# Patient Record
Sex: FEMALE | Race: WHITE | NOT HISPANIC OR LATINO | URBAN - METROPOLITAN AREA
[De-identification: names, ages, dates, MRNs, and addresses within clinical notes are randomized per-mention and may not be internally consistent; named-entity substitution may affect disease eponyms.]

---

## 2018-11-14 PROBLEM — Z00.00 ENCOUNTER FOR PREVENTIVE HEALTH EXAMINATION: Status: ACTIVE | Noted: 2018-11-14

## 2018-11-19 ENCOUNTER — OUTPATIENT (OUTPATIENT)
Dept: OUTPATIENT SERVICES | Facility: HOSPITAL | Age: 53
LOS: 1 days | End: 2018-11-19
Payer: COMMERCIAL

## 2018-11-19 ENCOUNTER — APPOINTMENT (OUTPATIENT)
Dept: MRI IMAGING | Facility: CLINIC | Age: 53
End: 2018-11-19
Payer: COMMERCIAL

## 2018-11-19 DIAGNOSIS — Z00.8 ENCOUNTER FOR OTHER GENERAL EXAMINATION: ICD-10-CM

## 2018-11-19 PROCEDURE — 72141 MRI NECK SPINE W/O DYE: CPT | Mod: 26

## 2018-11-19 PROCEDURE — 72141 MRI NECK SPINE W/O DYE: CPT

## 2018-11-23 ENCOUNTER — APPOINTMENT (OUTPATIENT)
Dept: MRI IMAGING | Facility: CLINIC | Age: 53
End: 2018-11-23
Payer: COMMERCIAL

## 2018-11-23 ENCOUNTER — OUTPATIENT (OUTPATIENT)
Dept: OUTPATIENT SERVICES | Facility: HOSPITAL | Age: 53
LOS: 1 days | End: 2018-11-23
Payer: COMMERCIAL

## 2018-11-23 DIAGNOSIS — Z00.8 ENCOUNTER FOR OTHER GENERAL EXAMINATION: ICD-10-CM

## 2018-11-23 PROCEDURE — 70551 MRI BRAIN STEM W/O DYE: CPT | Mod: 26

## 2018-11-23 PROCEDURE — 70551 MRI BRAIN STEM W/O DYE: CPT

## 2018-12-17 ENCOUNTER — OUTPATIENT (OUTPATIENT)
Dept: OUTPATIENT SERVICES | Facility: HOSPITAL | Age: 53
LOS: 1 days | End: 2018-12-17
Payer: COMMERCIAL

## 2018-12-17 ENCOUNTER — APPOINTMENT (OUTPATIENT)
Dept: RADIOLOGY | Facility: HOSPITAL | Age: 53
End: 2018-12-17

## 2018-12-17 DIAGNOSIS — G35 MULTIPLE SCLEROSIS: ICD-10-CM

## 2018-12-17 DIAGNOSIS — Z00.00 ENCOUNTER FOR GENERAL ADULT MEDICAL EXAMINATION WITHOUT ABNORMAL FINDINGS: ICD-10-CM

## 2018-12-17 LAB
APPEARANCE CSF: CLEAR — SIGNIFICANT CHANGE UP
APPEARANCE SPUN FLD: COLORLESS — SIGNIFICANT CHANGE UP
COLOR CSF: SIGNIFICANT CHANGE UP
GLUCOSE CSF-MCNC: 70 MG/DL — SIGNIFICANT CHANGE UP (ref 40–70)
LYMPHOCYTES # CSF: 45 % — SIGNIFICANT CHANGE UP (ref 40–80)
MONOS+MACROS NFR CSF: 55 % — HIGH (ref 15–45)
NEUTROPHILS # CSF: 0 % — SIGNIFICANT CHANGE UP (ref 0–6)
NRBC NFR CSF: 4 /UL — SIGNIFICANT CHANGE UP (ref 0–5)
PROT CSF-MCNC: 81 MG/DL — HIGH (ref 15–45)
RBC # CSF: 0 /UL — SIGNIFICANT CHANGE UP (ref 0–0)
TUBE TYPE: SIGNIFICANT CHANGE UP

## 2018-12-17 PROCEDURE — 83873 ASSAY OF CSF PROTEIN: CPT

## 2018-12-17 PROCEDURE — 77003 FLUOROGUIDE FOR SPINE INJECT: CPT | Mod: 26

## 2018-12-17 PROCEDURE — 82945 GLUCOSE OTHER FLUID: CPT

## 2018-12-17 PROCEDURE — 62270 DX LMBR SPI PNXR: CPT

## 2018-12-17 PROCEDURE — 86617 LYME DISEASE ANTIBODY: CPT

## 2018-12-17 PROCEDURE — 82787 IGG 1 2 3 OR 4 EACH: CPT

## 2018-12-17 PROCEDURE — 77003 FLUOROGUIDE FOR SPINE INJECT: CPT

## 2018-12-17 PROCEDURE — 84166 PROTEIN E-PHORESIS/URINE/CSF: CPT

## 2018-12-17 PROCEDURE — 84157 ASSAY OF PROTEIN OTHER: CPT

## 2018-12-17 PROCEDURE — 89051 BODY FLUID CELL COUNT: CPT

## 2018-12-18 LAB
ALBUMIN CSF-MCNC: 49.4 MG/DL — HIGH (ref 14–25)
IGG CSF-MCNC: 9.9 MG/DL — HIGH
IGG SYNTH RATE SER+CSF CALC-MRATE: 22.9 MG/DAY — HIGH
IGG/ALB CSF: 0.2 RATIO — SIGNIFICANT CHANGE UP
IGG4 SER-MCNC: 18.2 MG/DL — SIGNIFICANT CHANGE UP (ref 2.4–121)
PROT CSF-MCNC: 81 MG/DL — HIGH (ref 15–45)

## 2018-12-20 LAB
B BURGDOR AB CSF-ACNC: SIGNIFICANT CHANGE UP
OLIGOCLONAL BANDS CSF ELPH-IMP: SIGNIFICANT CHANGE UP

## 2018-12-28 LAB — MBP CSF-MCNC: <2 MCG/L — LOW (ref 2–4)

## 2022-10-29 ENCOUNTER — EMERGENCY (EMERGENCY)
Facility: HOSPITAL | Age: 57
LOS: 1 days | Discharge: HOME | End: 2022-10-29
Attending: EMERGENCY MEDICINE | Admitting: EMERGENCY MEDICINE
Payer: COMMERCIAL

## 2022-10-29 VITALS
TEMPERATURE: 98 F | HEART RATE: 120 BPM | OXYGEN SATURATION: 98 % | RESPIRATION RATE: 20 BRPM | SYSTOLIC BLOOD PRESSURE: 186 MMHG | DIASTOLIC BLOOD PRESSURE: 104 MMHG

## 2022-10-29 PROCEDURE — 72170 X-RAY EXAM OF PELVIS: CPT | Mod: 26

## 2022-10-29 PROCEDURE — 99284 EMERGENCY DEPT VISIT MOD MDM: CPT

## 2022-10-29 RX ORDER — GABAPENTIN 400 MG/1
600 CAPSULE ORAL ONCE
Refills: 0 | Status: COMPLETED | OUTPATIENT
Start: 2022-10-29 | End: 2022-10-29

## 2022-10-29 RX ORDER — OXYCODONE AND ACETAMINOPHEN 5; 325 MG/1; MG/1
1 TABLET ORAL ONCE
Refills: 0 | Status: DISCONTINUED | OUTPATIENT
Start: 2022-10-29 | End: 2022-10-29

## 2022-10-29 RX ORDER — BACLOFEN 100 %
20 POWDER (GRAM) MISCELLANEOUS ONCE
Refills: 0 | Status: COMPLETED | OUTPATIENT
Start: 2022-10-29 | End: 2022-10-29

## 2022-10-29 RX ADMIN — Medication 20 MILLIGRAM(S): at 20:44

## 2022-10-29 RX ADMIN — OXYCODONE AND ACETAMINOPHEN 1 TABLET(S): 5; 325 TABLET ORAL at 20:44

## 2022-10-29 RX ADMIN — GABAPENTIN 600 MILLIGRAM(S): 400 CAPSULE ORAL at 20:44

## 2022-10-29 NOTE — ED PROVIDER NOTE - OBJECTIVE STATEMENT
57-year-old white female living at home with  with multiple sclerosis presents to the emergency department at [facility] after a nonsyncopal slow gradual fall to the floor in the bathroom.   was apparently using a [employer] lift and while patient was holding bar she gradually lost balance causing her to gradually fall to the floor landing on her buttocks.  There was no head injury and patient denies any headache neck pain chest pain back pain abdominal pain.  She does have chronic abdominal bloating which has not changed.

## 2022-10-29 NOTE — ED ADULT NURSE NOTE - CHIEF COMPLAINT QUOTE
57yr old female arrived to ED via ems c/o fall from standing, denies hitting head or loc, no thinners. pt noted to have hx of MS, noted to be having generalized body pain.

## 2022-10-29 NOTE — ED PROVIDER NOTE - NSFOLLOWUPINSTRUCTIONS_ED_ALL_ED_FT
Rest.  Motrin or Tylenol for pain if needed as directed.  Follow-up with your primary care physician.  I suggest following up with your  to assist in providing further help at home or facility.  Return here if needed.

## 2022-10-29 NOTE — ED PROVIDER NOTE - PATIENT PORTAL LINK FT
You can access the FollowMyHealth Patient Portal offered by [facility] by registering at the following website: [email or web address]. By joining [facility]’s FollowMyHealth portal, you will also be able to view your health information using other applications (apps) compatible with our system.

## 2022-10-29 NOTE — ED PROVIDER NOTE - PROGRESS NOTE DETAILS
Prolonged conversation between myself  and patient occurred offering assistance in the form of  evaluation in the morning and potential placement in either a rehab facility or long-term care facility if assessment deems that it is necessary.  Patient denies needing that much help and refuses in fact any additional assistance.   present at the bedside for conversation.  Patient just wants to be assessed in the emergency department to make sure nothing acute is happening and then to be discharged back to her house. SS evaluated patient and felt that patient is not a candidate for any rehab and can/should go home with private aides to be hired.

## 2022-10-29 NOTE — ED ADULT NURSE NOTE - OBJECTIVE STATEMENT
No noted injuries.  Noted skin breakdown under breasts, fungal looking in nature.  States generalized back pain, chronic as per pt. No noted injuries.  Noted skin breakdown under breasts, fungal looking in nature.  States generalized back pain, chronic as per pt.  Noted distended large, Firm abd.  pt and  state this is normal

## 2022-10-29 NOTE — ED PROVIDER NOTE - CLINICAL SUMMARY MEDICAL DECISION MAKING FREE TEXT BOX
Case is a 57-year-old female with multiple sclerosis with a non syncopal gradual fall to the floor sustaining potential injury to buttocks bilaterally here now requiring evaluation and imaging.

## 2022-10-29 NOTE — ED PROVIDER NOTE - CARE PROVIDERS DIRECT ADDRESSES
[email or web address]

## 2022-10-29 NOTE — ED PROVIDER NOTE - CARE PROVIDER_API CALL
Anat Oshea (DO)  Family Medicine  [address] [place] [address]  Phone: [phone]  Fax: [phone]  Follow Up Time:

## 2022-10-29 NOTE — ED ADULT TRIAGE NOTE - CHIEF COMPLAINT QUOTE
57yr old female arrived to ED via ems c/o fall from standing, noted to have hx of MS, noted to be having generalized body pain. 57yr old female arrived to ED via ems c/o fall from standing, denies hitting head or loc, no thinners. pt noted to have hx of MS, noted to be having generalized body pain.

## 2022-10-30 VITALS
SYSTOLIC BLOOD PRESSURE: 153 MMHG | OXYGEN SATURATION: 96 % | RESPIRATION RATE: 18 BRPM | HEART RATE: 92 BPM | TEMPERATURE: 98 F | DIASTOLIC BLOOD PRESSURE: 82 MMHG

## 2022-10-30 LAB
SARS-COV-2 RNA SPEC QL NAA+PROBE: SIGNIFICANT CHANGE UP

## 2022-10-30 PROCEDURE — U0005: CPT

## 2022-10-30 PROCEDURE — U0003: CPT

## 2022-10-30 PROCEDURE — 94660 CPAP INITIATION&MGMT: CPT

## 2022-10-30 PROCEDURE — 99283 EMERGENCY DEPT VISIT LOW MDM: CPT | Mod: 25

## 2022-10-30 PROCEDURE — 72170 X-RAY EXAM OF PELVIS: CPT

## 2022-10-30 RX ORDER — GABAPENTIN 400 MG/1
600 CAPSULE ORAL ONCE
Refills: 0 | Status: COMPLETED | OUTPATIENT
Start: 2022-10-30 | End: 2022-10-30

## 2022-10-30 RX ORDER — TAMSULOSIN HYDROCHLORIDE 0.4 MG/1
0.4 CAPSULE ORAL ONCE
Refills: 0 | Status: COMPLETED | OUTPATIENT
Start: 2022-10-30 | End: 2022-10-30

## 2022-10-30 RX ORDER — TIZANIDINE 4 MG/1
12 TABLET ORAL ONCE
Refills: 0 | Status: COMPLETED | OUTPATIENT
Start: 2022-10-30 | End: 2022-10-30

## 2022-10-30 RX ORDER — OXYCODONE AND ACETAMINOPHEN 5; 325 MG/1; MG/1
1 TABLET ORAL ONCE
Refills: 0 | Status: DISCONTINUED | OUTPATIENT
Start: 2022-10-30 | End: 2022-10-30

## 2022-10-30 RX ORDER — BACLOFEN 100 %
20 POWDER (GRAM) MISCELLANEOUS ONCE
Refills: 0 | Status: COMPLETED | OUTPATIENT
Start: 2022-10-30 | End: 2022-10-30

## 2022-10-30 RX ADMIN — Medication 20 MILLIGRAM(S): at 14:35

## 2022-10-30 RX ADMIN — OXYCODONE AND ACETAMINOPHEN 1 TABLET(S): 5; 325 TABLET ORAL at 01:40

## 2022-10-30 RX ADMIN — TIZANIDINE 12 MILLIGRAM(S): 4 TABLET ORAL at 01:40

## 2022-10-30 RX ADMIN — GABAPENTIN 600 MILLIGRAM(S): 400 CAPSULE ORAL at 14:35

## 2023-11-27 RX ORDER — BACLOFEN 100 %
40 POWDER (GRAM) MISCELLANEOUS
Qty: 0 | Refills: 0 | DISCHARGE

## 2023-11-27 RX ORDER — BACLOFEN 100 %
20 POWDER (GRAM) MISCELLANEOUS
Qty: 0 | Refills: 0 | DISCHARGE

## 2023-11-27 RX ORDER — TOLTERODINE TARTRATE 1 MG/1
1 TABLET, FILM COATED ORAL
Qty: 0 | Refills: 0 | DISCHARGE

## 2023-11-27 RX ORDER — ASPIRIN/CALCIUM CARB/MAGNESIUM 324 MG
325 TABLET ORAL
Qty: 0 | Refills: 0 | DISCHARGE

## 2023-11-27 RX ORDER — GABAPENTIN 400 MG/1
1 CAPSULE ORAL
Qty: 0 | Refills: 0 | DISCHARGE

## 2023-11-27 RX ORDER — OXYCODONE AND ACETAMINOPHEN 5; 325 MG/1; MG/1
1 TABLET ORAL
Qty: 0 | Refills: 0 | DISCHARGE

## 2023-11-27 RX ORDER — TAMSULOSIN HYDROCHLORIDE 0.4 MG/1
1 CAPSULE ORAL
Qty: 0 | Refills: 0 | DISCHARGE

## 2023-11-27 RX ORDER — TIZANIDINE 4 MG/1
4 TABLET ORAL
Qty: 0 | Refills: 0 | DISCHARGE

## 2023-11-27 RX ORDER — ROSUVASTATIN CALCIUM 5 MG/1
1 TABLET ORAL
Qty: 0 | Refills: 0 | DISCHARGE

## 2023-11-27 RX ORDER — FUROSEMIDE 40 MG
1 TABLET ORAL
Qty: 0 | Refills: 0 | DISCHARGE

## 2025-03-07 NOTE — ED ADULT NURSE NOTE - SKIN TEMPERATURE MOISTURE
Detail Level: Simple
Instructions: This plan will send the code FBSD to the PM system.  DO NOT or CHANGE the price.
Price (Do Not Change): 0.00
warm